# Patient Record
Sex: MALE | Race: WHITE
[De-identification: names, ages, dates, MRNs, and addresses within clinical notes are randomized per-mention and may not be internally consistent; named-entity substitution may affect disease eponyms.]

---

## 2022-05-27 ENCOUNTER — HOSPITAL ENCOUNTER (OUTPATIENT)
Dept: HOSPITAL 79 - EXRD | Age: 42
End: 2022-05-27
Attending: STUDENT IN AN ORGANIZED HEALTH CARE EDUCATION/TRAINING PROGRAM
Payer: COMMERCIAL

## 2022-05-27 DIAGNOSIS — K75.81: Primary | ICD-10-CM

## 2022-05-27 DIAGNOSIS — N28.1: ICD-10-CM

## 2024-05-02 ENCOUNTER — OFFICE VISIT (OUTPATIENT)
Dept: CARDIOLOGY | Facility: CLINIC | Age: 44
End: 2024-05-02
Payer: COMMERCIAL

## 2024-05-02 VITALS
OXYGEN SATURATION: 96 % | WEIGHT: 194 LBS | RESPIRATION RATE: 16 BRPM | HEIGHT: 64 IN | SYSTOLIC BLOOD PRESSURE: 106 MMHG | HEART RATE: 84 BPM | DIASTOLIC BLOOD PRESSURE: 62 MMHG | BODY MASS INDEX: 33.12 KG/M2

## 2024-05-02 DIAGNOSIS — E11.9 TYPE 2 DIABETES MELLITUS WITHOUT COMPLICATION, WITHOUT LONG-TERM CURRENT USE OF INSULIN: ICD-10-CM

## 2024-05-02 DIAGNOSIS — Z82.49 FAMILY HISTORY OF PREMATURE CORONARY ARTERY DISEASE: ICD-10-CM

## 2024-05-02 DIAGNOSIS — F17.220 NICOTINE DEPENDENCE, CHEWING TOBACCO, UNCOMPLICATED: ICD-10-CM

## 2024-05-02 DIAGNOSIS — R94.31 ABNORMAL EKG: Primary | ICD-10-CM

## 2024-05-02 RX ORDER — ERGOCALCIFEROL (VITAMIN D2) 10 MCG
400 TABLET ORAL DAILY
COMMUNITY

## 2024-05-02 RX ORDER — TIRZEPATIDE 2.5 MG/.5ML
2.5 INJECTION, SOLUTION SUBCUTANEOUS WEEKLY
COMMUNITY
Start: 2024-04-22

## 2024-05-02 RX ORDER — LISINOPRIL 20 MG/1
1 TABLET ORAL DAILY
COMMUNITY
Start: 2024-03-13

## 2024-05-02 RX ORDER — UBIDECARENONE 75 MG
50 CAPSULE ORAL DAILY
COMMUNITY

## 2024-05-02 RX ORDER — DAPAGLIFLOZIN 10 MG/1
1 TABLET, FILM COATED ORAL DAILY
COMMUNITY
Start: 2024-04-22

## 2024-05-02 RX ORDER — ROSUVASTATIN CALCIUM 40 MG/1
40 TABLET, COATED ORAL
COMMUNITY
Start: 2024-03-13

## 2024-05-02 NOTE — LETTER
May 4, 2024     Nicolasa Carr MD  [address] [facility] [address]  [facility] [address]    Patient: Clive Perkins   YOB: 1980   Date of Visit: 5/2/2024     Dear :       Thank you for referring Clive Perkins to me for evaluation. Below are the relevant portions of my assessment and plan of care.    If you have questions, please do not hesitate to call me. I look forward to following Clive along with you.         Sincerely,        Kenny Lynn MD        CC: No Recipients    Kenny Lynn MD  05/04/24 1547  Sign when Signing Visit  Nicolasa Carr MD  Clive Perkins  1980  05/02/2024      Dear :    Subjective     Clive Perkins is a 43 y.o. male with the problems as listed above, presents    Chief complaint: Abnormal EKG noted at his PCPs office visit.    History of Present Illness: Mr. Clive Perkins is a pleasant 43-year-old  male with no history of known heart disease, has type 2 diabetes mellitus and dyslipidemia, non-smoker but chews tobacco and a positive family history of premature coronary artery disease with this dad having had coronary artery disease in his 40s, presents after he was noted to have an abnormality on EKG at his PCPs office recently.  On further questioning he denies any complaints of chest pain or discomfort or shortness of breath.  He denies any significant palpitations, dizziness or syncope.    Cardiac risk factors:diabetes mellitus, hypercholesterolemia, Positive family Hx. of premature athersclerotivc disease., and male gender.    No Known Allergies:    Current Outpatient Medications:   •  Farxiga 10 MG tablet, Take 10 mg by mouth Daily., Disp: , Rfl:   •  lisinopril (PRINIVIL,ZESTRIL) 20 MG tablet, Take 1 tablet by mouth Daily., Disp: , Rfl:   •  Mounjaro 2.5 MG/0.5ML solution pen-injector pen, Inject 0.5 mL under the skin into the appropriate area as directed 1 (One) Time Per Week., Disp: , Rfl:   •  rosuvastatin (CRESTOR) 40 MG  "tablet, Take 1 tablet by mouth every night at bedtime., Disp: , Rfl:   •  vitamin B-12 (CYANOCOBALAMIN) 100 MCG tablet, Take 0.5 tablets by mouth Daily., Disp: , Rfl:   •  Vitamin D, Cholecalciferol, (CHOLECALCIFEROL) 10 MCG (400 UNIT) tablet, Take 1 tablet by mouth Daily., Disp: , Rfl:     Past Medical History:   Diagnosis Date   • Diabetes mellitus    • Hyperlipidemia      Past Surgical History:   Procedure Laterality Date   • APPENDECTOMY       Family History   Problem Relation Age of Onset   • Heart disease Mother    • Heart attack Father    • Heart disease Father      Social History     Tobacco Use   • Smoking status: Never   • Smokeless tobacco: Current     Types: Snuff   Substance Use Topics   • Alcohol use: Yes     Comment: social   • Drug use: Never     Review of Systems   Constitutional: Negative for chills, fever, malaise/fatigue, weight gain and weight loss.   HENT:  Positive for congestion. Negative for nosebleeds.    Eyes:  Positive for visual disturbance.   Cardiovascular:  Negative for chest pain, irregular heartbeat, leg swelling and orthopnea.   Respiratory:  Negative for cough, hemoptysis and shortness of breath.    Endocrine: Positive for polydipsia.   Musculoskeletal:  Positive for stiffness.   Gastrointestinal:  Negative for abdominal pain, change in bowel habit, hematemesis, melena and vomiting.   Genitourinary:  Negative for dysuria, frequency and hematuria.   Neurological:  Negative for focal weakness, headaches, light-headedness and weakness.     Objective   Blood pressure 106/62, pulse 84, resp. rate 16, height 162.6 cm (64\"), weight 88 kg (194 lb), SpO2 96%.  Body mass index is 33.3 kg/m².    Vitals reviewed.   Constitutional:       Appearance: Well-developed.   Eyes:      Conjunctiva/sclera: Conjunctivae normal.   HENT:      Head: Normocephalic.   Neck:      Thyroid: No thyromegaly.      Vascular: No JVD.      Trachea: No tracheal deviation.   Pulmonary:      Effort: No respiratory " distress.      Breath sounds: Normal breath sounds. No wheezing. No rales.   Cardiovascular:      PMI at left midclavicular line. Normal rate. Regular rhythm. Normal S1. Normal S2.       Murmurs: There is no murmur.      No gallop.  No click. No rub.   Pulses:     Intact distal pulses.   Edema:     Peripheral edema absent.   Abdominal:      General: Bowel sounds are normal.      Palpations: Abdomen is soft. There is no abdominal mass.      Tenderness: There is no abdominal tenderness.   Musculoskeletal:      Cervical back: Normal range of motion and neck supple. Skin:     General: Skin is warm and dry.   Neurological:      Mental Status: Alert and oriented to person, place, and time.      Cranial Nerves: No cranial nerve deficit.         ECG 12 Lead    Date/Time: 5/2/2024 10:45 AM  Performed by: Kenny Lynn MD    Authorized by: Kenny Lynn MD  Comparison: compared with previous ECG from 3/26/2024  Similar to previous ECG  Rhythm: sinus rhythm  Conduction: conduction normal  ST Segments: ST segments normal  T Waves: T waves normal        Assessment & Plan    Diagnosis Plan   1. Abnormal EKG  Treadmill Stress Test      2. Family history of premature coronary artery disease  ECG 12 Lead    Treadmill Stress Test      3. Nicotine dependence, chewing tobacco, uncomplicated        4. Type 2 diabetes mellitus without complication, without long-term current use of insulin            Recommendations  Orders Placed This Encounter   Procedures   • Treadmill Stress Test   • ECG 12 Lead      Since he has positive family history of premature coronary artery disease and type 2 diabetes mellitus, will try to evaluate further with a treadmill stress EKG test.   I have also discussed about adverse effects of chewing tobacco and discussed about the quitting it.  He expressed understanding.    Return in about 2 months (around 7/2/2024).    As always,   I appreciate very much the opportunity to participate in the  cardiovascular care of your patients. Please do not hesitate to call me with any questions with regards to Clive Perkins's evaluation and management.     With Best Regards,        Kenny Lynn MD, FACC    Please note that portions of this note were completed with a voice recognition program.

## 2024-05-02 NOTE — PROGRESS NOTES
Nicolasa Carr MD  Clive Perkins  1980  05/02/2024      Dear :    Subjective     Cliev Perkins is a 43 y.o. male with the problems as listed above, presents    Chief complaint: Abnormal EKG noted at his PCPs office visit.    History of Present Illness: Mr. Clive Perkins is a pleasant 43-year-old  male with no history of known heart disease, has type 2 diabetes mellitus and dyslipidemia, non-smoker but chews tobacco and a positive family history of premature coronary artery disease with this dad having had coronary artery disease in his 40s, presents after he was noted to have an abnormality on EKG at his PCPs office recently.  On further questioning he denies any complaints of chest pain or discomfort or shortness of breath.  He denies any significant palpitations, dizziness or syncope.    Cardiac risk factors:diabetes mellitus, hypercholesterolemia, Positive family Hx. of premature athersclerotivc disease., and male gender.    No Known Allergies:    Current Outpatient Medications:     Farxiga 10 MG tablet, Take 10 mg by mouth Daily., Disp: , Rfl:     lisinopril (PRINIVIL,ZESTRIL) 20 MG tablet, Take 1 tablet by mouth Daily., Disp: , Rfl:     Mounjaro 2.5 MG/0.5ML solution pen-injector pen, Inject 0.5 mL under the skin into the appropriate area as directed 1 (One) Time Per Week., Disp: , Rfl:     rosuvastatin (CRESTOR) 40 MG tablet, Take 1 tablet by mouth every night at bedtime., Disp: , Rfl:     vitamin B-12 (CYANOCOBALAMIN) 100 MCG tablet, Take 0.5 tablets by mouth Daily., Disp: , Rfl:     Vitamin D, Cholecalciferol, (CHOLECALCIFEROL) 10 MCG (400 UNIT) tablet, Take 1 tablet by mouth Daily., Disp: , Rfl:     Past Medical History:   Diagnosis Date    Diabetes mellitus     Hyperlipidemia      Past Surgical History:   Procedure Laterality Date    APPENDECTOMY       Family History   Problem Relation Age of Onset    Heart disease Mother     Heart attack Father     Heart disease Father   "    Social History     Tobacco Use    Smoking status: Never    Smokeless tobacco: Current     Types: Snuff   Substance Use Topics    Alcohol use: Yes     Comment: social    Drug use: Never     Review of Systems   Constitutional: Negative for chills, fever, malaise/fatigue, weight gain and weight loss.   HENT:  Positive for congestion. Negative for nosebleeds.    Eyes:  Positive for visual disturbance.   Cardiovascular:  Negative for chest pain, irregular heartbeat, leg swelling and orthopnea.   Respiratory:  Negative for cough, hemoptysis and shortness of breath.    Endocrine: Positive for polydipsia.   Musculoskeletal:  Positive for stiffness.   Gastrointestinal:  Negative for abdominal pain, change in bowel habit, hematemesis, melena and vomiting.   Genitourinary:  Negative for dysuria, frequency and hematuria.   Neurological:  Negative for focal weakness, headaches, light-headedness and weakness.     Objective   Blood pressure 106/62, pulse 84, resp. rate 16, height 162.6 cm (64\"), weight 88 kg (194 lb), SpO2 96%.  Body mass index is 33.3 kg/m².    Vitals reviewed.   Constitutional:       Appearance: Well-developed.   Eyes:      Conjunctiva/sclera: Conjunctivae normal.   HENT:      Head: Normocephalic.   Neck:      Thyroid: No thyromegaly.      Vascular: No JVD.      Trachea: No tracheal deviation.   Pulmonary:      Effort: No respiratory distress.      Breath sounds: Normal breath sounds. No wheezing. No rales.   Cardiovascular:      PMI at left midclavicular line. Normal rate. Regular rhythm. Normal S1. Normal S2.       Murmurs: There is no murmur.      No gallop.  No click. No rub.   Pulses:     Intact distal pulses.   Edema:     Peripheral edema absent.   Abdominal:      General: Bowel sounds are normal.      Palpations: Abdomen is soft. There is no abdominal mass.      Tenderness: There is no abdominal tenderness.   Musculoskeletal:      Cervical back: Normal range of motion and neck supple. Skin:     " General: Skin is warm and dry.   Neurological:      Mental Status: Alert and oriented to person, place, and time.      Cranial Nerves: No cranial nerve deficit.         ECG 12 Lead    Date/Time: 5/2/2024 10:45 AM  Performed by: Kenny Lynn MD    Authorized by: Kenny Lynn MD  Comparison: compared with previous ECG from 3/26/2024  Similar to previous ECG  Rhythm: sinus rhythm  Conduction: conduction normal  ST Segments: ST segments normal  T Waves: T waves normal        Assessment & Plan    Diagnosis Plan   1. Abnormal EKG  Treadmill Stress Test      2. Family history of premature coronary artery disease  ECG 12 Lead    Treadmill Stress Test      3. Nicotine dependence, chewing tobacco, uncomplicated        4. Type 2 diabetes mellitus without complication, without long-term current use of insulin            Recommendations  Orders Placed This Encounter   Procedures    Treadmill Stress Test    ECG 12 Lead      Since he has positive family history of premature coronary artery disease and type 2 diabetes mellitus, will try to evaluate further with a treadmill stress EKG test.   I have also discussed about adverse effects of chewing tobacco and discussed about the quitting it.  He expressed understanding.    Return in about 2 months (around 7/2/2024).    As always,   I appreciate very much the opportunity to participate in the cardiovascular care of your patients. Please do not hesitate to call me with any questions with regards to Clivenicholas Perkins's evaluation and management.     With Best Regards,        Kenny Lynn MD, [facility]    Please note that portions of this note were completed with a voice recognition program.

## 2024-05-31 ENCOUNTER — HOSPITAL ENCOUNTER (OUTPATIENT)
Dept: CARDIOLOGY | Facility: HOSPITAL | Age: 44
Discharge: HOME | End: 2024-05-31
Payer: COMMERCIAL

## 2024-05-31 DIAGNOSIS — Z82.49 FAMILY HISTORY OF PREMATURE CORONARY ARTERY DISEASE: ICD-10-CM

## 2024-05-31 DIAGNOSIS — R94.31 ABNORMAL EKG: ICD-10-CM

## 2024-05-31 LAB
BH CV STRESS BP STAGE 1: NORMAL
BH CV STRESS BP STAGE 2: NORMAL
BH CV STRESS DURATION MIN STAGE 1: 3
BH CV STRESS DURATION MIN STAGE 2: 3
BH CV STRESS DURATION SEC STAGE 1: 0
BH CV STRESS DURATION SEC STAGE 2: 0
BH CV STRESS DURATION SEC STAGE 3: 30
BH CV STRESS GRADE STAGE 1: 10
BH CV STRESS GRADE STAGE 2: 12
BH CV STRESS GRADE STAGE 3: 14
BH CV STRESS HR STAGE 1: 123
BH CV STRESS HR STAGE 2: 150
BH CV STRESS HR STAGE 3: 160
BH CV STRESS METS STAGE 1: 5
BH CV STRESS METS STAGE 2: 7.5
BH CV STRESS METS STAGE 3: 10
BH CV STRESS PROTOCOL 1: NORMAL
BH CV STRESS RECOVERY BP: NORMAL MMHG
BH CV STRESS RECOVERY HR: 99 BPM
BH CV STRESS SPEED STAGE 1: 1.7
BH CV STRESS SPEED STAGE 2: 2.5
BH CV STRESS SPEED STAGE 3: 3.4
BH CV STRESS STAGE 1: 1
BH CV STRESS STAGE 2: 2
BH CV STRESS STAGE 3: 3
MAXIMAL PREDICTED HEART RATE: 178 BPM
PERCENT MAX PREDICTED HR: 89.89 %
STRESS BASELINE BP: NORMAL MMHG
STRESS BASELINE HR: 96 BPM
STRESS PERCENT HR: 106 %
STRESS POST ESTIMATED WORKLOAD: 8.5 METS
STRESS POST EXERCISE DUR MIN: 6 MIN
STRESS POST EXERCISE DUR SEC: 30 SEC
STRESS POST PEAK BP: NORMAL MMHG
STRESS POST PEAK HR: 160 BPM
STRESS TARGET HR: 151 BPM

## 2024-05-31 PROCEDURE — 93017 CV STRESS TEST TRACING ONLY: CPT

## 2024-07-05 ENCOUNTER — OFFICE VISIT (OUTPATIENT)
Dept: CARDIOLOGY | Facility: CLINIC | Age: 44
End: 2024-07-05
Payer: COMMERCIAL

## 2024-07-05 VITALS
BODY MASS INDEX: 32.03 KG/M2 | HEIGHT: 64 IN | DIASTOLIC BLOOD PRESSURE: 80 MMHG | HEART RATE: 78 BPM | OXYGEN SATURATION: 98 % | WEIGHT: 187.6 LBS | SYSTOLIC BLOOD PRESSURE: 133 MMHG

## 2024-07-05 DIAGNOSIS — E78.5 DYSLIPIDEMIA: Primary | ICD-10-CM

## 2024-07-05 PROCEDURE — 99214 OFFICE O/P EST MOD 30 MIN: CPT | Performed by: PHYSICIAN ASSISTANT

## 2024-07-05 RX ORDER — CETIRIZINE HYDROCHLORIDE 10 MG/1
10 TABLET ORAL DAILY
COMMUNITY

## 2024-07-05 RX ORDER — FLUTICASONE PROPIONATE 50 MCG
1 SPRAY, SUSPENSION (ML) NASAL DAILY
COMMUNITY

## 2024-07-05 NOTE — PROGRESS NOTES
"Nicolasa Carr MD  Clive Perkins  1980  07/05/2024    There is no problem list on file for this patient.      Dear Nicolasa Carr MD:    Subjective     History of Present Illness:    Chief Complaint   Patient presents with    Follow-up     ROUTINE WITH STRESS RESULTS       Clive Perkins is a pleasant 43 y.o. male with a past medical history significant for diabetes mellitus, dyslipidemia, essential hypertension.  He also chews tobacco.  There is some concern for family history of premature coronary artery disease with a father who had a \"cardiac event\" in his 40s however after discussing this with him further unsure exactly what occurred he did not undergo left heart catheterization to confirm.  He comes in today for cardiology follow-up.    Clive did have treadmill stress testing where he exercised for 6 minutes 30 seconds achieving 8.5 METS and 89% of his predicted max heart rate however nonspecific ST-T wave changes were seen.  Apparently the stress test was stopped by the tech after he achieved target heart rate however the patient reports he could have gone for much longer.  Clinically Clive is completely asymptomatic he denies any chest pains, shortness of breath, dizziness, or syncope.  Blood pressure is also well-controlled.  I did receive labs from March which showed a markedly elevated cholesterol with an LDL of 234 however he reports at the time he was transitioning to a new health insurance and thus had not been on any of his medications and has since been started back, which does include Crestor 40 mg.        No Known Allergies:      Current Outpatient Medications:     cetirizine (zyrTEC) 10 MG tablet, Take 1 tablet by mouth Daily., Disp: , Rfl:     Farxiga 10 MG tablet, Take 10 mg by mouth Daily., Disp: , Rfl:     fluticasone (FLONASE) 50 MCG/ACT nasal spray, 1 spray by Each Nare route Daily., Disp: , Rfl:     lisinopril (PRINIVIL,ZESTRIL) 20 MG tablet, Take 1 tablet by mouth " "Daily., Disp: , Rfl:     Mounjaro 2.5 MG/0.5ML solution pen-injector pen, Inject 0.5 mL under the skin into the appropriate area as directed 1 (One) Time Per Week., Disp: , Rfl:     rosuvastatin (CRESTOR) 40 MG tablet, Take 1 tablet by mouth every night at bedtime., Disp: , Rfl:     vitamin B-12 (CYANOCOBALAMIN) 100 MCG tablet, Take 0.5 tablets by mouth Daily., Disp: , Rfl:     Vitamin D, Cholecalciferol, (CHOLECALCIFEROL) 10 MCG (400 UNIT) tablet, Take 1 tablet by mouth Daily., Disp: , Rfl:     The following portions of the patient's history were reviewed and updated as appropriate: allergies, current medications, past family history, past medical history, past social history, past surgical history and problem list.    Social History     Tobacco Use    Smoking status: Never    Smokeless tobacco: Current     Types: Snuff   Substance Use Topics    Alcohol use: Yes     Comment: social    Drug use: Never         Objective   Vitals:    07/05/24 0941   BP: 133/80   Pulse: 78   SpO2: 98%   Weight: 85.1 kg (187 lb 9.6 oz)   Height: 162.6 cm (64\")     Body mass index is 32.2 kg/m².    ROS    Constitutional:       General: Not in acute distress.     Appearance: Healthy appearance. Well-developed and not in distress. Not diaphoretic.   Eyes:      Conjunctiva/sclera: Conjunctivae normal.      Pupils: Pupils are equal, round, and reactive to light.   HENT:      Head: Normocephalic and atraumatic.   Neck:      Vascular: No carotid bruit or JVD.   Pulmonary:      Effort: Pulmonary effort is normal. No respiratory distress.      Breath sounds: Normal breath sounds.   Cardiovascular:      Normal rate. Regular rhythm.   Edema:     Peripheral edema absent.   Skin:     General: Skin is cool.   Neurological:      Mental Status: Alert, oriented to person, place, and time and oriented to person, place and time.         No results found for: \"NA\", \"K\", \"CL\", \"CO2\", \"BUN\", \"CREATININE\", \"LABGLOM\", \"GLUCOSE\", \"CALCIUM\", \"AST\", \"ALT\", " "\"ALKPHOS\", \"LABIL2\"  No results found for: \"CKTOTAL\"  No results found for: \"WBC\", \"HGB\", \"HCT\", \"PLT\"  No results found for: \"INR\"  No results found for: \"MG\"  No results found for: \"TSH\", \"PSA\", \"CHLPL\", \"TRIG\", \"HDL\", \"LDL\"   No results found for: \"BNP\"    During this visit the following were done:  Labs Reviewed []    Labs Ordered []    Radiology Reports Reviewed []    Radiology Ordered []    PCP Records Reviewed []    Referring Provider Records Reviewed []    ER Records Reviewed []    Hospital Records Reviewed []    History Obtained From Family []    Radiology Images Reviewed []    Other Reviewed []    Records Requested []       Procedures    Assessment & Plan    Diagnosis Plan   1. Dyslipidemia  Lipid Panel    Comprehensive Metabolic Panel               Recommendations:  Abnormal EKG  Subsequent treadmill stress testing was indeterminate however patient completely asymptomatic and repeat EKG in our office is unremarkable.  Since he is completely asymptomatic I do not think at the moment that further testing is necessary.  However I did explain to him that if he were to become symptomatic or have further concerns I would recommend proceeding with CT coronary angiogram.  Dyslipidemia  His LDL was markedly elevated in March with an LDL of 234 this does raise suspicion for underlying familial hypercholesterolemia.      He does report at the time he was not on any of his medication as he was transitioning to a new health insurance and he reports he has since been started on Crestor 40 and has not had lipid panel rechecked.      I discussed with him about rechecking this and if still elevated transitioning him to PCSK9 inhibitor, Repatha or starting Leqvio however he declined wishing to have this managed by PCP.    No follow-ups on file.    As always, I appreciate very much the opportunity to participate in the cardiovascular care of your patients.      With Best Regards,    Ousmane Freed PA-C          "